# Patient Record
Sex: FEMALE | Race: WHITE | Employment: OTHER | URBAN - METROPOLITAN AREA
[De-identification: names, ages, dates, MRNs, and addresses within clinical notes are randomized per-mention and may not be internally consistent; named-entity substitution may affect disease eponyms.]

---

## 2017-01-01 ENCOUNTER — APPOINTMENT (OUTPATIENT)
Dept: CV DIAGNOSTICS | Facility: HOSPITAL | Age: 82
DRG: 683 | End: 2017-01-01
Attending: INTERNAL MEDICINE
Payer: MEDICARE

## 2017-01-01 ENCOUNTER — APPOINTMENT (OUTPATIENT)
Dept: CT IMAGING | Facility: HOSPITAL | Age: 82
End: 2017-01-01
Attending: EMERGENCY MEDICINE
Payer: MEDICARE

## 2017-01-01 ENCOUNTER — HOSPITAL ENCOUNTER (EMERGENCY)
Facility: HOSPITAL | Age: 82
Discharge: HOME | End: 2017-01-01
Attending: EMERGENCY MEDICINE
Payer: MEDICARE

## 2017-01-01 ENCOUNTER — HOSPITAL ENCOUNTER (INPATIENT)
Facility: HOSPITAL | Age: 82
LOS: 3 days | Discharge: NURSING, REHAB OR LONG-TERM CARE | DRG: 812 | End: 2017-01-01
Attending: EMERGENCY MEDICINE | Admitting: HOSPITALIST
Payer: MEDICARE

## 2017-01-01 ENCOUNTER — SNF VISIT (OUTPATIENT)
Dept: INTERNAL MEDICINE CLINIC | Age: 82
End: 2017-01-01

## 2017-01-01 ENCOUNTER — SNF VISIT (OUTPATIENT)
Dept: FAMILY MEDICINE CLINIC | Facility: CLINIC | Age: 82
End: 2017-01-01

## 2017-01-01 ENCOUNTER — APPOINTMENT (OUTPATIENT)
Dept: CT IMAGING | Facility: HOSPITAL | Age: 82
DRG: 683 | End: 2017-01-01
Attending: EMERGENCY MEDICINE
Payer: MEDICARE

## 2017-01-01 ENCOUNTER — APPOINTMENT (OUTPATIENT)
Dept: GENERAL RADIOLOGY | Facility: HOSPITAL | Age: 82
DRG: 683 | End: 2017-01-01
Attending: EMERGENCY MEDICINE
Payer: MEDICARE

## 2017-01-01 ENCOUNTER — HOSPITAL ENCOUNTER (INPATIENT)
Facility: HOSPITAL | Age: 82
LOS: 2 days | Discharge: NURSING, REHAB OR LONG-TERM CARE | DRG: 683 | End: 2017-01-01
Attending: EMERGENCY MEDICINE | Admitting: INTERNAL MEDICINE
Payer: MEDICARE

## 2017-01-01 VITALS
DIASTOLIC BLOOD PRESSURE: 39 MMHG | WEIGHT: 131 LBS | RESPIRATION RATE: 18 BRPM | BODY MASS INDEX: 29.47 KG/M2 | SYSTOLIC BLOOD PRESSURE: 99 MMHG | HEIGHT: 56 IN | OXYGEN SATURATION: 99 % | TEMPERATURE: 98 F | HEART RATE: 72 BPM

## 2017-01-01 VITALS
RESPIRATION RATE: 19 BRPM | DIASTOLIC BLOOD PRESSURE: 46 MMHG | HEART RATE: 68 BPM | BODY MASS INDEX: 29 KG/M2 | OXYGEN SATURATION: 95 % | SYSTOLIC BLOOD PRESSURE: 98 MMHG | TEMPERATURE: 98 F | WEIGHT: 131 LBS

## 2017-01-01 VITALS
OXYGEN SATURATION: 94 % | RESPIRATION RATE: 18 BRPM | HEART RATE: 78 BPM | SYSTOLIC BLOOD PRESSURE: 95 MMHG | TEMPERATURE: 100 F | DIASTOLIC BLOOD PRESSURE: 53 MMHG

## 2017-01-01 VITALS
WEIGHT: 131 LBS | HEART RATE: 76 BPM | SYSTOLIC BLOOD PRESSURE: 125 MMHG | DIASTOLIC BLOOD PRESSURE: 67 MMHG | TEMPERATURE: 98 F | BODY MASS INDEX: 29 KG/M2 | RESPIRATION RATE: 16 BRPM | OXYGEN SATURATION: 98 %

## 2017-01-01 VITALS
OXYGEN SATURATION: 97 % | DIASTOLIC BLOOD PRESSURE: 58 MMHG | HEART RATE: 72 BPM | RESPIRATION RATE: 18 BRPM | SYSTOLIC BLOOD PRESSURE: 108 MMHG | TEMPERATURE: 98 F

## 2017-01-01 DIAGNOSIS — R53.1 WEAKNESS: Primary | ICD-10-CM

## 2017-01-01 DIAGNOSIS — S09.90XA INJURY OF HEAD, INITIAL ENCOUNTER: ICD-10-CM

## 2017-01-01 DIAGNOSIS — N39.0 URINARY TRACT INFECTION WITHOUT HEMATURIA, SITE UNSPECIFIED: ICD-10-CM

## 2017-01-01 DIAGNOSIS — D64.9 ANEMIA, UNSPECIFIED TYPE: ICD-10-CM
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
## 2017-01-01 DIAGNOSIS — K92.1 MELENA: ICD-10-CM

## 2017-01-01 DIAGNOSIS — I95.9 HYPOTENSION, UNSPECIFIED HYPOTENSION TYPE: ICD-10-CM

## 2017-01-01 DIAGNOSIS — R77.8 TROPONIN LEVEL ELEVATED: ICD-10-CM

## 2017-01-01 DIAGNOSIS — M54.2 NECK PAIN: Primary | ICD-10-CM

## 2017-01-01 DIAGNOSIS — D50.8 OTHER IRON DEFICIENCY ANEMIA: Primary | ICD-10-CM

## 2017-01-01 LAB
ALBUMIN SERPL-MCNC: 2.6 G/DL (ref 3.5–4.8)
ALP LIVER SERPL-CCNC: 120 U/L (ref 55–142)
ALT SERPL-CCNC: 17 U/L (ref 14–54)
ANTIBODY SCREEN: NEGATIVE
APTT PPP: 36.5 SECONDS (ref 25–34)
AST SERPL-CCNC: 40 U/L (ref 15–41)
ATRIAL RATE: 56 BPM
BASOPHILS # BLD AUTO: 0.02 X10(3) UL (ref 0–0.1)
BASOPHILS # BLD AUTO: 0.02 X10(3) UL (ref 0–0.1)
BASOPHILS NFR BLD AUTO: 0.2 %
BASOPHILS NFR BLD AUTO: 0.3 %
BILIRUB SERPL-MCNC: 0.3 MG/DL (ref 0.1–2)
BILIRUB UR QL STRIP.AUTO: NEGATIVE
BLOOD TYPE BARCODE: 8400
BUN BLD-MCNC: 23 MG/DL (ref 8–20)
BUN BLD-MCNC: 27 MG/DL (ref 8–20)
CALCIUM BLD-MCNC: 7.9 MG/DL (ref 8.3–10.3)
CALCIUM BLD-MCNC: 8.6 MG/DL (ref 8.3–10.3)
CHLORIDE: 107 MMOL/L (ref 101–111)
CHLORIDE: 111 MMOL/L (ref 101–111)
CK: 818 IU/L (ref 26–192)
CKMB: 31.3 NG/ML (ref 0–5)
CO2: 24 MMOL/L (ref 22–32)
CO2: 25 MMOL/L (ref 22–32)
CREAT BLD-MCNC: 0.76 MG/DL (ref 0.55–1.02)
CREAT BLD-MCNC: 1.09 MG/DL (ref 0.55–1.02)
EOSINOPHIL # BLD AUTO: 0.04 X10(3) UL (ref 0–0.3)
EOSINOPHIL # BLD AUTO: 0.48 X10(3) UL (ref 0–0.3)
EOSINOPHIL NFR BLD AUTO: 0.4 %
EOSINOPHIL NFR BLD AUTO: 6.7 %
ERYTHROCYTE [DISTWIDTH] IN BLOOD BY AUTOMATED COUNT: 15.1 % (ref 11.5–16)
ERYTHROCYTE [DISTWIDTH] IN BLOOD BY AUTOMATED COUNT: 15.3 % (ref 11.5–16)
ERYTHROCYTE [DISTWIDTH] IN BLOOD BY AUTOMATED COUNT: 15.5 % (ref 11.5–16)
ERYTHROCYTE [DISTWIDTH] IN BLOOD BY AUTOMATED COUNT: 16.4 % (ref 11.5–16)
GLUCOSE BLD-MCNC: 143 MG/DL (ref 70–99)
GLUCOSE BLD-MCNC: 85 MG/DL (ref 70–99)
GLUCOSE UR STRIP.AUTO-MCNC: NEGATIVE MG/DL
HCT VFR BLD AUTO: 25.3 % (ref 34–50)
HCT VFR BLD AUTO: 25.3 % (ref 34–50)
HCT VFR BLD AUTO: 25.6 % (ref 34–50)
HCT VFR BLD AUTO: 27.3 % (ref 34–50)
HGB BLD-MCNC: 7.5 G/DL (ref 12–16)
HGB BLD-MCNC: 8 G/DL (ref 12–16)
HGB BLD-MCNC: 8 G/DL (ref 12–16)
HGB BLD-MCNC: 8.4 G/DL (ref 12–16)
HYALINE CASTS #/AREA URNS AUTO: PRESENT /LPF
IMMATURE GRANULOCYTE COUNT: 0.03 X10(3) UL (ref 0–1)
IMMATURE GRANULOCYTE COUNT: 0.06 X10(3) UL (ref 0–1)
IMMATURE GRANULOCYTE RATIO %: 0.4 %
IMMATURE GRANULOCYTE RATIO %: 0.5 %
INR BLD: 1.09 (ref 0.89–1.11)
LYMPHOCYTES # BLD AUTO: 0.56 X10(3) UL (ref 0.9–4)
LYMPHOCYTES # BLD AUTO: 0.99 X10(3) UL (ref 0.9–4)
LYMPHOCYTES NFR BLD AUTO: 13.8 %
LYMPHOCYTES NFR BLD AUTO: 4.9 %
M PROTEIN MFR SERPL ELPH: 6.5 G/DL (ref 6.1–8.3)
MB INDEX: 4 % (ref ?–4)
MCH RBC QN AUTO: 23.7 PG (ref 27–33.2)
MCH RBC QN AUTO: 24.7 PG (ref 27–33.2)
MCH RBC QN AUTO: 25.2 PG (ref 27–33.2)
MCH RBC QN AUTO: 25.6 PG (ref 27–33.2)
MCHC RBC AUTO-ENTMCNC: 29.6 G/DL (ref 31–37)
MCHC RBC AUTO-ENTMCNC: 30.8 G/DL (ref 31–37)
MCHC RBC AUTO-ENTMCNC: 31.3 G/DL (ref 31–37)
MCHC RBC AUTO-ENTMCNC: 31.6 G/DL (ref 31–37)
MCV RBC AUTO: 80.1 FL (ref 81–100)
MCV RBC AUTO: 80.3 FL (ref 81–100)
MCV RBC AUTO: 80.8 FL (ref 81–100)
MCV RBC AUTO: 80.8 FL (ref 81–100)
MONOCYTES # BLD AUTO: 0.67 X10(3) UL (ref 0.1–0.6)
MONOCYTES # BLD AUTO: 0.75 X10(3) UL (ref 0.1–0.6)
MONOCYTES NFR BLD AUTO: 6.6 %
MONOCYTES NFR BLD AUTO: 9.4 %
NEUTROPHIL ABS PRELIM: 4.96 X10 (3) UL (ref 1.3–6.7)
NEUTROPHIL ABS PRELIM: 9.91 X10 (3) UL (ref 1.3–6.7)
NEUTROPHILS # BLD AUTO: 4.96 X10(3) UL (ref 1.3–6.7)
NEUTROPHILS # BLD AUTO: 9.91 X10(3) UL (ref 1.3–6.7)
NEUTROPHILS NFR BLD AUTO: 69.4 %
NEUTROPHILS NFR BLD AUTO: 87.4 %
NITRITE UR QL STRIP.AUTO: NEGATIVE
P AXIS: 41 DEGREES
P-R INTERVAL: 138 MS
PH UR STRIP.AUTO: 5 [PH] (ref 4.5–8)
PLATELET # BLD AUTO: 287 10(3)UL (ref 150–450)
PLATELET # BLD AUTO: 290 10(3)UL (ref 150–450)
PLATELET # BLD AUTO: 299 10(3)UL (ref 150–450)
PLATELET # BLD AUTO: 367 10(3)UL (ref 150–450)
POTASSIUM SERPL-SCNC: 3.3 MMOL/L (ref 3.6–5.1)
POTASSIUM SERPL-SCNC: 4 MMOL/L (ref 3.6–5.1)
PROT UR STRIP.AUTO-MCNC: 30 MG/DL
PSA SERPL DL<=0.01 NG/ML-MCNC: 14.1 SECONDS (ref 12–14.3)
Q-T INTERVAL: 518 MS
QRS DURATION: 146 MS
QTC CALCULATION (BEZET): 499 MS
R AXIS: -62 DEGREES
RBC # BLD AUTO: 3.13 X10(6)UL (ref 3.8–5.1)
RBC # BLD AUTO: 3.16 X10(6)UL (ref 3.8–5.1)
RBC # BLD AUTO: 3.17 X10(6)UL (ref 3.8–5.1)
RBC # BLD AUTO: 3.4 X10(6)UL (ref 3.8–5.1)
RED CELL DISTRIBUTION WIDTH-SD: 43.8 FL (ref 35.1–46.3)
RED CELL DISTRIBUTION WIDTH-SD: 44.2 FL (ref 35.1–46.3)
RED CELL DISTRIBUTION WIDTH-SD: 45.6 FL (ref 35.1–46.3)
RED CELL DISTRIBUTION WIDTH-SD: 47.3 FL (ref 35.1–46.3)
RH BLOOD TYPE: POSITIVE
SODIUM SERPL-SCNC: 141 MMOL/L (ref 136–144)
SODIUM SERPL-SCNC: 143 MMOL/L (ref 136–144)
SP GR UR STRIP.AUTO: 1.02 (ref 1–1.03)
T AXIS: 54 DEGREES
TROPONIN: 0.07 NG/ML (ref ?–0.05)
TROPONIN: 0.07 NG/ML (ref ?–0.05)
TROPONIN: 0.1 NG/ML (ref ?–0.05)
UROBILINOGEN UR STRIP.AUTO-MCNC: 4 MG/DL
VENTRICULAR RATE: 56 BPM
WBC # BLD AUTO: 11.3 X10(3) UL (ref 4–13)
WBC # BLD AUTO: 6.5 X10(3) UL (ref 4–13)
WBC # BLD AUTO: 6.7 X10(3) UL (ref 4–13)
WBC # BLD AUTO: 7.2 X10(3) UL (ref 4–13)
WBC CLUMPS UR QL AUTO: PRESENT

## 2017-01-01 PROCEDURE — 36430 TRANSFUSION BLD/BLD COMPNT: CPT

## 2017-01-01 PROCEDURE — 87081 CULTURE SCREEN ONLY: CPT | Performed by: HOSPITALIST

## 2017-01-01 PROCEDURE — 97530 THERAPEUTIC ACTIVITIES: CPT

## 2017-01-01 PROCEDURE — 86850 RBC ANTIBODY SCREEN: CPT | Performed by: EMERGENCY MEDICINE

## 2017-01-01 PROCEDURE — 96365 THER/PROPH/DIAG IV INF INIT: CPT

## 2017-01-01 PROCEDURE — 97167 OT EVAL HIGH COMPLEX 60 MIN: CPT

## 2017-01-01 PROCEDURE — 96374 THER/PROPH/DIAG INJ IV PUSH: CPT

## 2017-01-01 PROCEDURE — 80048 BASIC METABOLIC PNL TOTAL CA: CPT | Performed by: HOSPITALIST

## 2017-01-01 PROCEDURE — 82553 CREATINE MB FRACTION: CPT | Performed by: INTERNAL MEDICINE

## 2017-01-01 PROCEDURE — 30233N1 TRANSFUSION OF NONAUTOLOGOUS RED BLOOD CELLS INTO PERIPHERAL VEIN, PERCUTANEOUS APPROACH: ICD-10-PCS | Performed by: INTERNAL MEDICINE

## 2017-01-01 PROCEDURE — 82272 OCCULT BLD FECES 1-3 TESTS: CPT

## 2017-01-01 PROCEDURE — 86901 BLOOD TYPING SEROLOGIC RH(D): CPT | Performed by: EMERGENCY MEDICINE

## 2017-01-01 PROCEDURE — 87184 SC STD DISK METHOD PER PLATE: CPT | Performed by: EMERGENCY MEDICINE

## 2017-01-01 PROCEDURE — 99305 1ST NF CARE MODERATE MDM 35: CPT | Performed by: NURSE PRACTITIONER

## 2017-01-01 PROCEDURE — 96375 TX/PRO/DX INJ NEW DRUG ADDON: CPT

## 2017-01-01 PROCEDURE — 86900 BLOOD TYPING SEROLOGIC ABO: CPT | Performed by: EMERGENCY MEDICINE

## 2017-01-01 PROCEDURE — 85025 COMPLETE CBC W/AUTO DIFF WBC: CPT | Performed by: EMERGENCY MEDICINE

## 2017-01-01 PROCEDURE — 97110 THERAPEUTIC EXERCISES: CPT

## 2017-01-01 PROCEDURE — 84300 ASSAY OF URINE SODIUM: CPT | Performed by: HOSPITALIST

## 2017-01-01 PROCEDURE — 87077 CULTURE AEROBIC IDENTIFY: CPT | Performed by: EMERGENCY MEDICINE

## 2017-01-01 PROCEDURE — 97535 SELF CARE MNGMENT TRAINING: CPT

## 2017-01-01 PROCEDURE — 99285 EMERGENCY DEPT VISIT HI MDM: CPT

## 2017-01-01 PROCEDURE — 80053 COMPREHEN METABOLIC PANEL: CPT | Performed by: EMERGENCY MEDICINE

## 2017-01-01 PROCEDURE — 36415 COLL VENOUS BLD VENIPUNCTURE: CPT

## 2017-01-01 PROCEDURE — C9113 INJ PANTOPRAZOLE SODIUM, VIA: HCPCS | Performed by: EMERGENCY MEDICINE

## 2017-01-01 PROCEDURE — 86920 COMPATIBILITY TEST SPIN: CPT

## 2017-01-01 PROCEDURE — 85025 COMPLETE CBC W/AUTO DIFF WBC: CPT | Performed by: HOSPITALIST

## 2017-01-01 PROCEDURE — 96361 HYDRATE IV INFUSION ADD-ON: CPT

## 2017-01-01 PROCEDURE — 93010 ELECTROCARDIOGRAM REPORT: CPT

## 2017-01-01 PROCEDURE — 81001 URINALYSIS AUTO W/SCOPE: CPT | Performed by: EMERGENCY MEDICINE

## 2017-01-01 PROCEDURE — 84439 ASSAY OF FREE THYROXINE: CPT | Performed by: HOSPITALIST

## 2017-01-01 PROCEDURE — 84484 ASSAY OF TROPONIN QUANT: CPT | Performed by: EMERGENCY MEDICINE

## 2017-01-01 PROCEDURE — 97162 PT EVAL MOD COMPLEX 30 MIN: CPT

## 2017-01-01 PROCEDURE — 73502 X-RAY EXAM HIP UNI 2-3 VIEWS: CPT | Performed by: EMERGENCY MEDICINE

## 2017-01-01 PROCEDURE — 73700 CT LOWER EXTREMITY W/O DYE: CPT | Performed by: EMERGENCY MEDICINE

## 2017-01-01 PROCEDURE — 84443 ASSAY THYROID STIM HORMONE: CPT | Performed by: HOSPITALIST

## 2017-01-01 PROCEDURE — 87086 URINE CULTURE/COLONY COUNT: CPT | Performed by: EMERGENCY MEDICINE

## 2017-01-01 PROCEDURE — 99308 SBSQ NF CARE LOW MDM 20: CPT | Performed by: FAMILY MEDICINE

## 2017-01-01 PROCEDURE — 85730 THROMBOPLASTIN TIME PARTIAL: CPT | Performed by: EMERGENCY MEDICINE

## 2017-01-01 PROCEDURE — 87186 SC STD MICRODIL/AGAR DIL: CPT | Performed by: FAMILY MEDICINE

## 2017-01-01 PROCEDURE — 82550 ASSAY OF CK (CPK): CPT | Performed by: INTERNAL MEDICINE

## 2017-01-01 PROCEDURE — 87493 C DIFF AMPLIFIED PROBE: CPT | Performed by: INTERNAL MEDICINE

## 2017-01-01 PROCEDURE — 99284 EMERGENCY DEPT VISIT MOD MDM: CPT

## 2017-01-01 PROCEDURE — 87186 SC STD MICRODIL/AGAR DIL: CPT | Performed by: EMERGENCY MEDICINE

## 2017-01-01 PROCEDURE — 72125 CT NECK SPINE W/O DYE: CPT | Performed by: EMERGENCY MEDICINE

## 2017-01-01 PROCEDURE — 87077 CULTURE AEROBIC IDENTIFY: CPT | Performed by: FAMILY MEDICINE

## 2017-01-01 PROCEDURE — 83935 ASSAY OF URINE OSMOLALITY: CPT | Performed by: HOSPITALIST

## 2017-01-01 PROCEDURE — 84484 ASSAY OF TROPONIN QUANT: CPT | Performed by: HOSPITALIST

## 2017-01-01 PROCEDURE — 93005 ELECTROCARDIOGRAM TRACING: CPT

## 2017-01-01 PROCEDURE — 71010 XR CHEST AP PORTABLE  (CPT=71010): CPT | Performed by: EMERGENCY MEDICINE

## 2017-01-01 PROCEDURE — 85025 COMPLETE CBC W/AUTO DIFF WBC: CPT | Performed by: INTERNAL MEDICINE

## 2017-01-01 PROCEDURE — 93306 TTE W/DOPPLER COMPLETE: CPT | Performed by: INTERNAL MEDICINE

## 2017-01-01 PROCEDURE — 76377 3D RENDER W/INTRP POSTPROCES: CPT | Performed by: EMERGENCY MEDICINE

## 2017-01-01 PROCEDURE — 83930 ASSAY OF BLOOD OSMOLALITY: CPT | Performed by: HOSPITALIST

## 2017-01-01 PROCEDURE — 85027 COMPLETE CBC AUTOMATED: CPT | Performed by: HOSPITALIST

## 2017-01-01 PROCEDURE — 85610 PROTHROMBIN TIME: CPT | Performed by: EMERGENCY MEDICINE

## 2017-01-01 PROCEDURE — 82746 ASSAY OF FOLIC ACID SERUM: CPT | Performed by: FAMILY MEDICINE

## 2017-01-01 PROCEDURE — 87086 URINE CULTURE/COLONY COUNT: CPT | Performed by: FAMILY MEDICINE

## 2017-01-01 PROCEDURE — 73560 X-RAY EXAM OF KNEE 1 OR 2: CPT | Performed by: EMERGENCY MEDICINE

## 2017-01-01 PROCEDURE — 82607 VITAMIN B-12: CPT | Performed by: FAMILY MEDICINE

## 2017-01-01 PROCEDURE — 70450 CT HEAD/BRAIN W/O DYE: CPT | Performed by: EMERGENCY MEDICINE

## 2017-01-01 RX ORDER — ONDANSETRON 2 MG/ML
4 INJECTION INTRAMUSCULAR; INTRAVENOUS EVERY 6 HOURS PRN
Status: DISCONTINUED | OUTPATIENT
Start: 2017-01-01 | End: 2017-01-01

## 2017-01-01 RX ORDER — LEVOTHYROXINE SODIUM 0.05 MG/1
50 TABLET ORAL
Status: DISCONTINUED | OUTPATIENT
Start: 2017-01-01 | End: 2017-01-01

## 2017-01-01 RX ORDER — POTASSIUM CHLORIDE 20 MEQ/1
40 TABLET, EXTENDED RELEASE ORAL ONCE
Status: COMPLETED | OUTPATIENT
Start: 2017-01-01 | End: 2017-01-01

## 2017-01-01 RX ORDER — HYDROCODONE BITARTRATE AND ACETAMINOPHEN 5; 325 MG/1; MG/1
1-2 TABLET ORAL EVERY 4 HOURS PRN
Qty: 20 TABLET | Refills: 0 | Status: SHIPPED | OUTPATIENT
Start: 2017-01-01 | End: 2017-01-01

## 2017-01-01 RX ORDER — CEPHALEXIN 500 MG/1
500 CAPSULE ORAL EVERY 8 HOURS SCHEDULED
Qty: 9 CAPSULE | Refills: 0 | Status: SHIPPED | OUTPATIENT
Start: 2017-01-01 | End: 2017-01-01

## 2017-01-01 RX ORDER — PROCHLORPERAZINE MALEATE 5 MG/1
5 TABLET ORAL EVERY 6 HOURS PRN
Status: DISCONTINUED | OUTPATIENT
Start: 2017-01-01 | End: 2017-01-01

## 2017-01-01 RX ORDER — SODIUM CHLORIDE 9 MG/ML
INJECTION, SOLUTION INTRAVENOUS CONTINUOUS
Status: DISCONTINUED | OUTPATIENT
Start: 2017-01-01 | End: 2017-01-01

## 2017-01-01 RX ORDER — LIDOCAINE HYDROCHLORIDE 10 MG/ML
20 INJECTION, SOLUTION INFILTRATION; PERINEURAL ONCE
Status: DISCONTINUED | OUTPATIENT
Start: 2017-01-01 | End: 2017-01-01

## 2017-01-01 RX ORDER — SODIUM CHLORIDE 9 MG/ML
INJECTION, SOLUTION INTRAVENOUS CONTINUOUS
Status: CANCELLED | OUTPATIENT
Start: 2017-01-01 | End: 2017-01-01

## 2017-01-01 RX ORDER — MELATONIN
325 3 TIMES DAILY
Status: DISCONTINUED | OUTPATIENT
Start: 2017-01-01 | End: 2017-01-01

## 2017-01-01 RX ORDER — MELATONIN
325
Status: DISCONTINUED | OUTPATIENT
Start: 2017-01-01 | End: 2017-01-01

## 2017-01-01 RX ORDER — BISACODYL 10 MG
10 SUPPOSITORY, RECTAL RECTAL
Status: DISCONTINUED | OUTPATIENT
Start: 2017-01-01 | End: 2017-01-01

## 2017-01-01 RX ORDER — ACETAMINOPHEN 325 MG/1
650 TABLET ORAL EVERY 6 HOURS PRN
COMMUNITY

## 2017-01-01 RX ORDER — SODIUM CHLORIDE 9 MG/ML
INJECTION, SOLUTION INTRAVENOUS CONTINUOUS
Status: ACTIVE | OUTPATIENT
Start: 2017-01-01 | End: 2017-01-01

## 2017-01-01 RX ORDER — ACETAMINOPHEN 325 MG/1
650 TABLET ORAL EVERY 6 HOURS PRN
Status: DISCONTINUED | OUTPATIENT
Start: 2017-01-01 | End: 2017-01-01

## 2017-01-01 RX ORDER — HYDROMORPHONE HYDROCHLORIDE 1 MG/ML
0.2 INJECTION, SOLUTION INTRAMUSCULAR; INTRAVENOUS; SUBCUTANEOUS EVERY 30 MIN PRN
Status: ACTIVE | OUTPATIENT
Start: 2017-01-01 | End: 2017-01-01

## 2017-01-01 RX ORDER — SODIUM CHLORIDE 9 MG/ML
125 INJECTION, SOLUTION INTRAVENOUS CONTINUOUS
Status: DISCONTINUED | OUTPATIENT
Start: 2017-01-01 | End: 2017-01-01

## 2017-01-01 RX ORDER — ONDANSETRON 2 MG/ML
4 INJECTION INTRAMUSCULAR; INTRAVENOUS EVERY 4 HOURS PRN
Status: DISCONTINUED | OUTPATIENT
Start: 2017-01-01 | End: 2017-01-01

## 2017-01-01 RX ORDER — FUROSEMIDE 40 MG/1
40 TABLET ORAL DAILY
Status: DISCONTINUED | OUTPATIENT
Start: 2017-01-01 | End: 2017-01-01

## 2017-01-01 RX ORDER — SODIUM PHOSPHATE, DIBASIC AND SODIUM PHOSPHATE, MONOBASIC 7; 19 G/133ML; G/133ML
1 ENEMA RECTAL ONCE AS NEEDED
Status: DISCONTINUED | OUTPATIENT
Start: 2017-01-01 | End: 2017-01-01

## 2017-01-01 RX ORDER — POLYETHYLENE GLYCOL 3350 17 G/17G
17 POWDER, FOR SOLUTION ORAL DAILY PRN
Status: DISCONTINUED | OUTPATIENT
Start: 2017-01-01 | End: 2017-01-01

## 2017-01-01 RX ORDER — 0.9 % SODIUM CHLORIDE 0.9 %
VIAL (ML) INJECTION
Status: COMPLETED
Start: 2017-01-01 | End: 2017-01-01

## 2017-01-01 RX ORDER — METOCLOPRAMIDE 10 MG/1
10 TABLET ORAL EVERY 6 HOURS PRN
Status: DISCONTINUED | OUTPATIENT
Start: 2017-01-01 | End: 2017-01-01

## 2017-01-01 RX ORDER — DOCUSATE SODIUM 100 MG/1
100 CAPSULE, LIQUID FILLED ORAL 2 TIMES DAILY
Status: DISCONTINUED | OUTPATIENT
Start: 2017-01-01 | End: 2017-01-01

## 2017-01-01 RX ORDER — CEPHALEXIN 500 MG/1
500 CAPSULE ORAL EVERY 8 HOURS SCHEDULED
Status: DISCONTINUED | OUTPATIENT
Start: 2017-01-01 | End: 2017-01-01

## 2017-01-01 RX ORDER — HEPARIN SODIUM 5000 [USP'U]/ML
5000 INJECTION, SOLUTION INTRAVENOUS; SUBCUTANEOUS EVERY 8 HOURS SCHEDULED
Status: DISCONTINUED | OUTPATIENT
Start: 2017-01-01 | End: 2017-01-01

## 2017-05-05 NOTE — ED NOTES
Patient brought to ED by her neighbor. Per neighbor, no family knows that the patient is here. Neighbor is leaving to see if she can get a hold of patient's family. Patient's neighbor is Casandra Ch and her number is [phone].

## 2017-05-05 NOTE — ED PROVIDER NOTES
Patient Seen in: [facility] Emergency Department    History   Patient presents with:  Fall (musculoskeletal, neurologic)    Stated Complaint: fall    HPI    29-year-old female comes the hospital to complaint of having difficulty with discomfort on h above.    PSFH elements reviewed from today and agreed except as otherwise stated in HPI.     Physical Exam       ED Triage Vitals   BP 05/05/17 1540 131/64 mmHg   Pulse 05/05/17 1540 74   Resp 05/05/17 1540 16   Temp 05/05/17 1540 98.3 °F (36.8 °C)   Temp Index Registry.     PATIENT STATED HISTORY: (As transcribed by Technologist) Christy Lloyd had a fall 3 weeks ago. Patient complains of neck pain and unable to lift left arm this morning.         FINDINGS:  No fracture.  Disc space narrowing at C4-5, C5-6 and C6

## 2017-05-06 NOTE — ED NOTES
Call to patient's grandson, Donnella Mortimer, who states his brother is en route to  his grandmother. Arrived just as ended phone call.

## 2017-06-03 ENCOUNTER — HOSPITAL (OUTPATIENT)
Dept: OTHER | Age: 82
End: 2017-06-03
Attending: EMERGENCY MEDICINE
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
## 2017-09-13 PROBLEM — S09.90XA INJURY OF HEAD, INITIAL ENCOUNTER: Status: ACTIVE | Noted: 2017-01-01
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
## 2017-09-13 PROBLEM — R77.8 TROPONIN LEVEL ELEVATED: Status: ACTIVE | Noted: 2017-01-01

## 2017-09-13 PROBLEM — N39.0 URINARY TRACT INFECTION WITHOUT HEMATURIA, SITE UNSPECIFIED: Status: ACTIVE | Noted: 2017-01-01

## 2017-09-13 NOTE — CONSULTS
[facility]  Report of Consultation    Amanda Solis Patient Status:  Emergency    1918 MRN [id number]   Location [address] [facility] Attending Kimberly Henry MD   Hosp Day # 0 PCP Gem Chan DO     Reason for Consultat not sure if she passed out. Medics and family came and she was brought to ER. W/u revealed several left rib fx although not sure if they are old or new. She has a swollen right knee without fracture although there is posterior displacement. Troponin 0. shaft/metaphysis with posterior angulation of the distal femoral metaphysis which is likely chronic. An acute fracture is not discretely noted. 3. No hardware failure or complications are appreciated.   4. Suprapatellar joint effusion with suggestion of ca

## 2017-09-13 NOTE — H&P
[facility] Hospitalist History and Physical      Patient presents with:  Fall (musculoskeletal, neurologic)       PCP: Francisco Vila DO      History of Present Illness: Patient is a 80year old female with PMH sig for B12 deficiency anemia who presents for evalu pallor, EOMs intact. Nose: Nares normal. Septum midline. Mucosa normal. No drainage.    Throat: Lips, mucosa, and tongue normal. Teeth and gums normal.   Neck: Supple, symmetrical, trachea midline, no cervical or supraclavicular lymph adenopathy, thyroid angulation of the distal femoral metaphysis which is likely chronic. An acute fracture is not discretely identified. SOFT TISSUES:  Negative. No visible soft tissue swelling.  EFFUSION:  There is evidence of a suprapatellar joint effusion with multiple sma CONCLUSION:  1. No acute intracranial process appreciated. 2. Mild to moderate diffuse atrophy and white matter disease, suggesting chronic small vessel ischemic changes. Dictated by: Radha Nazario DO on 9/13/2017 at 16:24     Approved by:  Dolores Silva skull base through C7. Multiplanar reconstructions are generated. Dose reduction techniques were used.  Dose information is transmitted to the [facility] [employer] of Radiology) NRDR ([address]) which includes the Dose Index Regist CONCLUSION:  1. Multiple left sided posterior lateral rib fractures without evidence of pneumothorax. The fractures are of uncertain acuity. 2. Hyperaeration of the lungs consistent with emphysema/COPD.  3. Mild pleural-parenchymal changes in the left c has Guaiac positive stool but no melena and no signs of active bleeding  --will transfuse 1 unit PRBCs as this may have contributed to fall and generalized weakness  --monitor CBC in AM post transfusion  --at this time likely does not need further GI evalu

## 2017-09-13 NOTE — ED INITIAL ASSESSMENT (HPI)
Patient presents following fall this morning, she was assisted by EMS but later developed pain to right knee. Patient currently alert to time and place.

## 2017-09-13 NOTE — ED PROVIDER NOTES
Patient Seen in: [facility] Emergency Department    History   Patient presents with:  Fall (musculoskeletal, neurologic)    Stated Complaint: fall, leg pain    HPI    This is a 80-year-old female who arrives here with complaints of a fall.   The joselito Exam  General: . Older frail female in no respiratory distress. There is no signs of trauma in the head or neck there is no midline neck tenderness she does complain of some mild right paraspinal neck tenderness.   But no midline tenderness  The patient i (*)     All other components within normal limits   TROPONIN I - Abnormal; Notable for the following:     Troponin 0.095 (*)     All other components within normal limits   PTT, ACTIVATED - Abnormal; Notable for the following:     PTT 36.5 (*)     All othe DRAW GOLD   URINE CULTURE, ROUTINE          ============================================================  ED Course  ------------------------------------------------------------  MDM       The patient was placed on monitors, IV was started, blood was drawn at 15:28     Approved by: Jason Ruiz DO            Ct Brain Or Head ([id number])    Result Date: 9/13/2017  PROCEDURE:  CT BRAIN OR HEAD (96332)  COMPARISON:  HARESH ALCALA IACS (W+WO) (CPT=70553), 6/02/2014, 11:07.   INDICATIONS:  fall, leg pain  TECHNIQUE: transmitted to the [facility] Radiology) [address] ([address]) which includes the Dose Index Registry  PATIENT STATED HISTORY: (As transcribed by Technologist)  Patient fell this morning. Patient complains of right leg pain. multilevel extensive disc height loss and endplate osteophytes most pronounced at C6-7. PARASPINAL AREA:  No paraspinal soft tissue swelling or mass lesions are identified. CONCLUSION:  1. No acute osseous injury/fractures noted.  2. Extensive degenera abnormalities of the pelvic region or hips. There is extensive degenerative disc disease at L4-5 with vacuum disc change. There is axial migration of both femoral heads into the acetabula consistent with underlying osteoarthropathy.  SOFT TISSUES:  Negative patient felt comfortable. She is moving all extremities.             Disposition and Plan     Clinical Impression:  Fall, initial encounter  (primary encounter diagnosis)  Injury of head, initial encounter  Urinary tract infection without hematuria, site u

## 2017-09-14 NOTE — PLAN OF CARE
Patient/Family Goals    • Patient/Family Long Term Goal Progressing    • Patient/Family Short Term Goal Progressing            NURSING ADMISSION NOTE      Patient admitted via Cart  Oriented to room. Safety precautions initiated. Bed in low position.   Ca

## 2017-09-14 NOTE — CM/SW NOTE
09/14/17 1700   CM/SW Referral Data   Referral Source Physician   Reason for Referral Discharge planning   Informant Patient   Pertinent Medical Hx   Primary Care Physician Name Dr. Aury Silvestre   Patient Info   Patient's Mental Status Alert;Oriented;Confused

## 2017-09-14 NOTE — PROGRESS NOTES
[facility] Hospitalist Progress Note                                                                   [address]  1/8/1918    SUBJECTIVE: f/u fall    Ms. Aggarwal Creed complaining of heel pain tod Odilia Iverson is a 79 yo female who presented for evaluation after a fall at home, found to have UTI and acute on chronic anemia     UTI  --could be etiology of fall  --culture growing Gram neg rods, likely E. Coli; continue IV ceftriaxone and deescelate when fi

## 2017-09-14 NOTE — PROGRESS NOTES
[facility]  Cardiology Progress Note    Shelby Sosa Patient Status:  Inpatient    1918 MRN [id number]   [facility] 8NE-A Attending Inga Lehman MD   [facility] Day # 1 PCP Bharti Rubio DO     Assessment:  Possible syncope Gross per 24 hour   Intake             1930 ml   Output               45 ml   Net             1885 ml     Last 3 Weights  09/13/17 1357 : 131 lb (59.4 kg)  06/22/17 1049 : 133 lb (60.3 kg)  05/05/17 1540 : 131 lb (59.4 kg)  02/17/17 1042 : 136 lb (61.7 kg)

## 2017-09-14 NOTE — PHYSICAL THERAPY NOTE
PHYSICAL THERAPY EVALUATION - INPATIENT     Room Number: 5993/1364-T  Evaluation Date: 9/14/2017  Type of Evaluation: Initial  Physician Order: PT Eval and Treat    Presenting Problem: fall, UTI  Reason for Therapy: Mobility Dysfunction and Discharge P Impaired  · Orientation Level:  oriented to person  · Memory:  impaired working memory, decreased recall of biographical information and decreased short term memory  · Following Commands:  follows one step commands with repetition  · Safety Judgement:  dec (G-Code): CL    FUNCTIONAL ABILITY STATUS  Gait Assessment   Gait Assistance: Not tested              Comment : pt dep for chair transfer, rec total lift for nsg    Skilled Therapy Provided: pt recd in supine, grandson present and assisting with informatio Form for the patient is 77% degree of basic mobility impairment. Based on this evaluation, patient's clinical presentation is evolving and overall the evaluation complexity is considered moderate.   These impairments and comorbidities manifest themselves

## 2017-09-14 NOTE — PLAN OF CARE
Patient/Family Goals    • Patient/Family Long Term Goal Progressing    • Patient/Family Short Term Goal Progressing        Patient is alert and oriented x4. NSR/SB on tele. Oxygenation is 94% on RA. VS stable. L sounds clear.  Patient is incontinent and br

## 2017-09-15 NOTE — OCCUPATIONAL THERAPY NOTE
OCCUPATIONAL THERAPY EVALUATION - INPATIENT     Room Number: 1161/2259-R  Evaluation Date: 9/15/2017  Type of Evaluation: Initial  Presenting Problem: Elevated troponin, Head injury, fall, UTI    Physician Order: IP Consult to Occupational Therapy  Reason oriented to person  Memory:  decreased recall of precautions, decreased recall of recent events, decreased long term memory and decreased short term memory  Following Commands:  follows one-step commands inconsistently  Initiation: cues to initiate tasks a Inpatient Daily Activity Short Form  How much help from another person does the patient currently need…  -   Putting on and taking off regular lower body clothing?: Total  -   Bathing (including washing, rinsing, drying)?: Total  -   Toileting, which inclu deficits: decreased ADL/IADL independence, decreased activity tolerance/endurance, decreased mobility/transfers, increased overall functional decline.  These deficits impact the patient’s ability to participate in all mobility and ADL's, instrumental [place]

## 2017-09-15 NOTE — PROGRESS NOTES
[facility]  Cardiology Progress Note    Nova Harrell Patient Status:  Inpatient    1918 MRN [id number]   [facility] 8NE-A Attending Armand Fernández, DO   Hosp Day # 1 PCP Fernanda Perkins DO     Assessment:  Syncope?-normal LV f cefTRIAXone  1 g Intravenous Q24H              Recent Labs   09/13/17  1426 09/14/17  0426 09/15/17  0449   WBC 11.3 6.5 6.7   HGB 7.5* 8.0* 8.0*   .0 290.0 299.0       Recent Labs   09/13/17  1426 09/14/17  0426   BUN 27* 23*   CREATSERUM 1.09* 0.7

## 2017-09-15 NOTE — PROGRESS NOTES
I%mpaired Functional Mobility     • Achieve highest/safest level of mobility/gait Progressing           Patient/Family Goals     • Patient/Family Long Term Goal Progressing     • Patient/Family Short Term Goal Progressing          Lying in bed confused,Tel

## 2017-09-15 NOTE — PHYSICAL THERAPY NOTE
PHYSICAL THERAPY TREATMENT NOTE - INPATIENT    Room Number: 3906/1522-A     Session: 1   Number of Visits to Meet Established Goals: 5     History related to current admission: pt admitted from home due to fall, possible syncope, lives alone and found on bedside commode, etc.): A Lot   -   Moving from lying on back to sitting on the side of the bed?: A Lot   How much help from another person does the patient currently need. ..   -   Moving to and from a bed to a chair (including a wheelchair)?: A Lot   - motion;Strengthening;Patient education; Family education; Energy conservation;Transfer training  Rehab Potential : Guarded  Frequency (Obs): 5x/week    CURRENT GOALS     Goal #1 Patient is able to demonstrate supine - sit EOB @ level: moderate assistance   G

## 2017-09-15 NOTE — OCCUPATIONAL THERAPY NOTE
OCCUPATIONAL THERAPY TREATMENT NOTE - INPATIENT     Room Number: 6396/3297-S  Session: 1  Number of Visits to Meet Established Goals: 6    Presenting Problem: Elevated troponin, Head injury, fall, UTI    History related to current admission: pt admitted fr [facility]    FUNCTIONAL TRANSFER ASSESSMENT  Supine to Sit : Not tested  Sit to Stand: Dependent assistance (x2)    Skilled Therapy Provided: Pt presents up in B S Chair upon entering room. Extended time spent on cognition/oreintation this treatment.   Pat only or 3x/week    ADL Goals   Patient will perform grooming: with max assist  Patient will perform toileting: with max assist     Functional Transfer Goals  Patient will perform all functional transfers:  with max assist     UE Exercise Program Goal  Patient will

## 2017-09-15 NOTE — PLAN OF CARE
Impaired Functional Mobility    • Achieve highest/safest level of mobility/gait Progressing        Patient/Family Goals    • Patient/Family Long Term Goal Progressing    • Patient/Family Short Term Goal Progressing

## 2017-09-15 NOTE — CM/SW NOTE
JUAN CARLOS met with Pt and Pt's granddaughter, Twyla Gaucher who was at bedside to discuss d/c plans. Pt prefers to remain in her own home.  Twyla Gaucher reports that Pt lives in a 2 bedroom 2 bath condo so having a live-in caregiver would be ideal.  Family has contacted \"in-home

## 2017-09-15 NOTE — PROGRESS NOTES
[facility] Hospitalist Progress Note                                                                   [address]  1/8/1918    SUBJECTIVE: f/u fall    Ms. Ludwig Simeon is confused this AM. She james PEG 3350, bisacodyl, FLEET ENEMA, ondansetron HCl    Assessment/Plan:  Ms. Karina Acuña is a 81 yo female who presented for evaluation after a fall at home, found to have UTI and acute on chronic anemia     UTI  --could be etiology of fall  --culture growing Gr

## 2017-09-16 NOTE — PLAN OF CARE
Impaired Functional Mobility    • Achieve highest/safest level of mobility/gait Progressing        Patient/Family Goals    • Patient/Family Long Term Goal Progressing    • Patient/Family Short Term Goal Progressing        Cardiac monitor shows. ... SR  Cont.

## 2017-09-16 NOTE — CM/SW NOTE
angella met with ALISA and Mary regarding dc planning. angella explained therapy recommendations and medicare criteria and coverage.  Lawernce Serum requesting therapy to re-visit pt as he states pt was ambulatory with RW prior to admission and doesn't understand how TREVER is not

## 2017-09-16 NOTE — PROGRESS NOTES
NURSING DISCHARGE NOTE    Discharged Home via Wheelchair. Accompanied by Family member and Support staff  Belongings Taken by patient/family. VSS. Ok for DC per Dr. Erwin Penny. BP stable post 250cc 0.9 NaCl bolus.  DC papers and [employer] paper

## 2017-09-16 NOTE — PHYSICAL THERAPY NOTE
PHYSICAL THERAPY TREATMENT NOTE - INPATIENT    Room Number: 6663/4159-G     Session: 2  Number of Visits to Meet Established Goals: 5     History related to current admission: pt admitted from home due to fall, possible syncope, lives alone and found on h bed?: A Lot   How much help from another person does the patient currently need. ..   -   Moving to and from a bed to a chair (including a wheelchair)?: Total   -   Need to walk in hospital room?: Total   -   Climbing 3-5 steps with a railing?: Total recommend TREVER upon discharge as pt is alert and participatory in all therapy activities. Pt has had a significant, acute decline in functional mobility as she was previously ambulating with Mod I.   Additionally, pt is making small but notable gains with s

## 2017-09-16 NOTE — PROGRESS NOTES
[facility] Hospitalist Progress Note                                                                   [address]  1/8/1918    SUBJECTIVE: f/u fall    S: Seems less confused today.  C/O R knee evaluation after a fall at home, found to have UTI and acute on chronic anemia     UTI  --could be etiology of fall  --culture growing E.coli, reviewed S - started on keflex to complete course  --no evidence of sepsis at this time    Hypotension  -Resume I

## 2017-09-16 NOTE — PLAN OF CARE
Pt a/ox4. RA. Lungs clear but diminished to posterior bases bilaterally. Hearing aids bilaterally. Forgetful at times. Hallucinations overnight. EF = 55-60%. Heparin SC. UTI. IV Rocephin. Pt reports pain to right knee only.  Refuses medications at this time

## 2017-09-16 NOTE — CONSULTS
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 Dieudonne Smith in 1-2 week for long term options

## 2017-09-16 NOTE — CM/SW NOTE
[facility] of choice for family as well. angella spoke to RN who states that pt may also be ready for discharge today. Milka Spencer understands this will now be private pay for patient.  He has discussed private pay rates with elizabeth

## 2017-09-18 NOTE — PROGRESS NOTES
Dimitri Milligan  : 1918  Age 80year old  female patient is admitted to The [address] [place] [address] for respit rehabilitation due to [place] date:  2017  Discharge date to [facility]:  2017    Patient presents with: REVIEW OF SYSTEMS:  GENERAL HEALTH:feels well otherwise  SKIN: denies any unusual skin lesions or rashes dry flakey skin   WOUNDS: none   EYES:--- with correction  HENT: denies nasal congestion, sinus pain or sore throat; and --- positive for hearing bradycardia there is no acute ST elevations or ischemic findings. Xr Knee (1 Or 2 Views), Right (cpt=73560)     Result Date: 9/13/2017  PROCEDURE:  XR KNEE (1 OR 2 VIEWS), RIGHT (CPT=73560)  COMPARISON:  None.   INDICATIONS:  fall, leg pain  PATIENT STA STATED HISTORY: (As transcribed by Technologist)  Patient fell this morning. No additional information at this time. FINDINGS:   VENTRICLES/SULCI:  Ventricles and sulci are prominent.  INTRACRANIAL:  No acute intracranial hemorrhage, mass effect or midli the articular disc. SOFT TISSUES:  No soft tissue swelling or mass lesions appreciated. EFFUSION:  None visible. OTHER:  Negative.       CONCLUSION:  1. No acute traumatic injuries to the right hip region noted.  2. There is suggestion of arthritic changes (CPT=71010)  TECHNIQUE:  AP chest radiograph was obtained. COMPARISON:  None. INDICATIONS:  Status post fall. PATIENT STATED HISTORY: (As transcribed by Technologist)  Patient complains of right leg pain after a fall.     FINDINGS:  There is hyperaeratio Extensive disc disease of the lower lumbar spine. Dictated by: Colleen Dubois DO on 9/13/2017 at 15:24     Approved by: Colleen Dubois DO          The patient did get a workup including a type and screen.   The patient's CBC shows a hemoglobin of 7.5 the f

## 2017-09-18 NOTE — CM/SW NOTE
09/18/17 1600   Discharge disposition   Discharged to: Skilled Nurs  (9/16)   Discharge transportation Private car

## 2017-09-19 NOTE — DISCHARGE SUMMARY
General Medicine Discharge Summary     Patient ID:  Nova Harrell  80year old  1/8/1918    Admit date: 9/13/2017    Discharge date and time: 9/16/2017  6:13 PM     Attending Physician: Baron Odom Pain/swelling  -xr without fracture  -consult placed to ortho, no further workup needed  -unable to bear weight per family  -PT consulted     Rib fractures  --unclear if acute or happened during this fall  --patient not complaining of pain in her ribs  --l daily., Normal, Disp-90 tablet, R-1    Ferrous Sulfate 325 (65 Fe) MG Oral Tab  TK 1 T PO TID, Historical, R-2    furosemide 40 MG Oral Tab  TK 1 T PO QD, Historical, R-0      STOP taking these medications    HYDROcodone-acetaminophen 5-325 MG Oral Tab

## 2017-09-25 PROBLEM — R73.9 HYPERGLYCEMIA: Status: ACTIVE | Noted: 2017-01-01

## 2017-09-25 PROBLEM — R79.89 AZOTEMIA: Status: ACTIVE | Noted: 2017-01-01

## 2017-09-25 PROBLEM — E87.1 HYPONATREMIA: Status: ACTIVE | Noted: 2017-01-01

## 2017-09-25 PROBLEM — D50.8 OTHER IRON DEFICIENCY ANEMIA: Status: ACTIVE | Noted: 2017-01-01

## 2017-09-25 PROBLEM — D64.9 ABSOLUTE ANEMIA: Status: ACTIVE | Noted: 2017-01-01

## 2017-09-25 PROBLEM — D64.9 ANEMIA: Status: ACTIVE | Noted: 2017-01-01

## 2017-09-25 PROBLEM — K92.1 MELENA: Status: ACTIVE | Noted: 2017-01-01

## 2017-09-26 NOTE — PLAN OF CARE
Maintains hematologic stability Progressing    Hgb 10.1 this am.  Absence of fever/infection during anticipated neutropenic period Progressing    Pt afebrile. Pt sleeping this am, now awake. Alert and oriented to person only, [place], macular degeneration.

## 2017-09-26 NOTE — H&P
[facility] Hospitalist H&P       CC: anemia    PCP: Ravinder Rodriges DO    History of Present Illness:   Pt is a 79 yo with chronic anemia (baseline Hb 8s), B12 deficiency, hypothryoidism, sent to ED for anemia. Hb found to be in 7s at [facility]. Here 8.4.   Was g Neck: Supple, symmetrical   Lungs:   Clear to auscultation bilaterally. Normal effort   Chest wall:  No tenderness or deformity. Heart:  Regular rate and rhythm, S1, S2 normal,no LE edema   Abdomen:   Soft, non-tender.  Bowel sounds normal. . Non disten

## 2017-09-26 NOTE — CM/SW NOTE
09/26/17 1300   CM/SW Referral Data   Referral Source Social Work (self-referral)   Reason for Referral Discharge planning   Informant Allyson Miguel; Other  (chart review)   Patient Info   Patient's Mental Status Confused   Patient's Home Environment Sub-

## 2017-09-26 NOTE — PROGRESS NOTES
09/26/17 1623   Clinical Encounter Type   Visited With Patient and family together   Routine Visit Introduction   Continue Visiting No   Patient's Supportive Strategies/Resources  provided emotional support for patient.    Referral From Nurse   P

## 2017-09-26 NOTE — ED PROVIDER NOTES
Patient Seen in: [facility] Emergency Department    History   Patient presents with:  Abnormal Result (metabolic, cardiac)    Stated Complaint: arrived via EMS for low Hgb, 7.4 today, 7.9 on 9/23    HPI    This is a 80-year-old female sent here to t pale.  HEENT: Pupils are equal reactive to light. Extra ocular motions are intact. No scleral icterus or conjunctival pallor: Neck is supple without tenderness on palpation. Head is atraumatic normocephalic. Oral mucosa moist.  Tongue is midline.   No p Narrative: The following orders were created for panel order TYPE AND SCREEN.   Procedure                               Abnormality         Status                     ---------                               -----------         ------

## 2017-09-26 NOTE — PROGRESS NOTES
Brief Internal Medicine Note    Full Note to Follow      Pt is a 81 yo with chronic anemia (baseline Hb 8s), B12 deficiency, hypothryoidism, sent to ED for anemia. Hb found to be in 7s at [facility]. Here 8.4.   Was given 2 U PRBC overnight with appropriate respo

## 2017-09-26 NOTE — CM/SW NOTE
JUAN CARLOS received a message from Shiloh Murillo confirming he would like the pt to return to [facility] at discharge. Cherri confirmed the pt is there under private pay.

## 2017-09-26 NOTE — ED NOTES
PRBC 1st unit transfusion started, verified with Cristopher Tan RN. Pt resting in bed, no change in assessment.  Family at bedside

## 2017-09-26 NOTE — ED NOTES
Pt resting in bed, more warm blankets provided per request. Family at bedside. Pt and family aware of the POC.  Awaiting PRBC transfusion

## 2017-09-26 NOTE — PHYSICAL THERAPY NOTE
PHYSICAL THERAPY EVALUATION - INPATIENT     Room Number: 418/418-A  Evaluation Date: 9/26/2017  Type of Evaluation: Initial  Physician Order: PT Eval and Treat    Presenting Problem: Anemia  Reason for Therapy: Mobility Dysfunction and Discharge [facility] following TREVER to support patient's dependent needs. SUBJECTIVE  \"whatever you say! \"    Patient self-stated goal is none stated    OBJECTIVE  Precautions: Bed/chair alarm; Low vision;Hard of hearing  Fall Risk: High fall risk    WEIGHT BEARING Scale): 23.55   CMS Modifier (G-Code): CN    FUNCTIONAL ABILITY STATUS  Gait Assessment   Gait Assistance: Not tested                   Skilled Therapy Provided: Patient received supine in bed, lethargic upon arrival but agreeable to therapy once becoming intervention following D/C to improve functional mobility and return to PLOF. Recommending trial TREVER to determine if patient is able to consistently and meaningfully participate in therapy.  If patient is unable to do so and if deficits persist, pt will naomy

## 2017-09-26 NOTE — PROGRESS NOTES
NURSING ADMISSION NOTE      Patient admitted via Cart from ER with blood transfusion going on. Patient is alert to name  only, but calm and  Cooperative, most admitting information given by family member at the bedside. Oriented to room.  Safety preca

## 2017-09-26 NOTE — ED INITIAL ASSESSMENT (HPI)
Arrived via EMS for low Hgb, 7.4 today, was 7.9 on 9/23. [facility]. She is alert, oriented x 2 which is her norm. Pt denies pain, denies MARKEL.  No reports of rectal bleed, hematuria or bleeding elsewhere per EMS repor

## 2017-09-27 NOTE — PHYSICAL THERAPY NOTE
PHYSICAL THERAPY TREATMENT NOTE - INPATIENT    Room Number: 923/703-L     Session: 1   Number of Visits to Meet Established Goals: 5    Presenting Problem: Anemia     History related to current admission: Pt is 80year old female admitted on 9/25/2017 fro a chair with arms (e.g., wheelchair, bedside commode, etc.): Unable   -   Moving from lying on back to sitting on the side of the bed?: Unable   How much help from another person does the patient currently need. ..   -   Moving to and from a bed to a chair transfer, but she is unable to correctly identify need for assistance. Will continue to follow patient for once more session to assess further need for skilled intervention.      PTA, patient receiving therapy at [facility] and will continue to benefit from skille

## 2017-09-27 NOTE — OCCUPATIONAL THERAPY NOTE
OCCUPATIONAL THERAPY EVALUATION - INPATIENT     Room Number: 418/418-A  Evaluation Date: 9/27/2017  Type of Evaluation: Initial  Presenting Problem: Anemia    Physician Order: IP Consult to Occupational Therapy  Reason for Therapy: ADL/IADL Dysfunction and hearing  Fall Risk: High fall risk    WEIGHT BEARING RESTRICTION                   PAIN ASSESSMENT  Ratin  Location: no pain at this time       COGNITION  Orientation Level:  oriented to person  Memory:  impaired working memory and decreased short term personal grooming such as brushing teeth?: A Lot  -   Eating meals?: A Little    AM-PAC Score:  Score: 9  Approx Degree of Impairment: 79.59%  Standardized Score (AM-PAC Scale): 25.33  CMS Modifier (G-Code): CL    FUNCTIONAL TRANSFER ASSESSMENT  Supine to her prior level of function. Subacute rehab is recommended for 5-7 days. After this period of rehabilitation patient should achieve max assist level in mobility and self-care.   Pt may require additional time at [facility] and a more supportive living environmen

## 2017-09-27 NOTE — PROGRESS NOTES
Alert,oriented. patient complained of abdominal pain,Tylenol x1 given. patient able to  Sleep. Mipelex to stage 2 coccyx wound C/D/I. Resting quietly at this time.

## 2017-09-27 NOTE — PLAN OF CARE
DR BOB NOTIFIED THAT PATIENT CONTINUES TO COMPLAIN OF PERSISTANT NAUSEA, NO EMESIS, OBTAINED NEW ORDER FOR REGLAN PRN DOSE FOR NAUSEA AND GIVEN

## 2017-09-27 NOTE — PLAN OF CARE
VITALS STABLE, RESTING IN BED, PATIENT HAD A FEW SPOONSFUL OF CHICKEN NOODLE SOUP.  PATIENT SAT UP IN CHAIR FOR APPROXIMATELY ONE IN A HALF HOURS

## 2017-09-27 NOTE — PROGRESS NOTES
[facility] Hospitalist Progress Note     PCP: Gem Chan DO    CC:  Follow up    SUBJECTIVE:  Pt sitting up in chair, asleep.  Appears comfortable    OBJECTIVE:  Temp:  [97.9 °F (36.6 °C)-98.5 °F (36.9 °C)] 98.5 °F (36.9 °C)  Pulse:  [68-77] 68  Resp:  [16-2 transfusion     B12 deficiency, hypothyroidism- no acute issues, continue home meds.  F/u with PCP as TSH level slightly elevated but fT4 normal--> suspect contributing to hyponatremia  -f/u with PCP in 1 week       Questions/concerns were discussed with pa

## 2017-09-27 NOTE — PLAN OF CARE
DISCHARGE PLANNING    • Discharge to home or other facility with appropriate resources Not Progressing        Impaired Activities of Daily Living    • Achieve highest/safest level of independence in self care Not Progressing        Impaired Functional Mobi

## 2017-09-28 NOTE — PLAN OF CARE
Impaired Functional Mobility    • Achieve highest/safest level of mobility/gait Not Progressing        SKIN/TISSUE INTEGRITY - ADULT    • Incision(s), wounds(s) or drain site(s) healing without S/S of infection Not Progressing          DISCHARGE PLANNING

## 2017-09-28 NOTE — CM/SW NOTE
JUAN CARLOS spoke w/[facility] management who confirmed the pt only had two midnights because the initial admitting order was signed and held then cancelled. Informed pt's HCPOA the pt would continue to private pay. ENRIQUE appeared upset due to the cost, but was agreeable.  U

## 2017-09-28 NOTE — PLAN OF CARE
A&Ox1, pleasantly confused. Hgb 9.9 from 9/26, s/p 2 PRBC. No s/s bleeding noted this shift. Up with max assist. BLE edema noted. Mepliex to sacrum for skin breakdown. Plan to DC today. Granddaughter at the bedside, updated with plan.

## 2017-09-28 NOTE — CM/SW NOTE
JUAN CARLOS spoke w/Cherri regarding pt's discharge. Cherri stated there is question whether or not the pt meets criteria to dc to [facility] under medicare benefits or private pay.  Cherri stated the dc needed to be cancelled until the admission order is clarified

## 2017-09-28 NOTE — PLAN OF CARE
Patient up in chair while rounding with night nurse, patient is tolerating chicken noodle soup and jello.  Patient up with assistance of two, difficult to transfer, patient is apprehensive, and doesn't assist, brief in place, has been incontient of urine se

## 2017-09-28 NOTE — DISCHARGE SUMMARY
General Medicine Discharge Summary     Patient ID:  Dimitri Milligan  80year old  1/8/1918    Admit date: 9/25/2017    Discharge date and time:9/28/2017    Attending Physician: Rachael Wise MD Tab  Take 1 tablet (50 mcg total) by mouth daily. Potassium Chloride ER 20 MEQ Oral Tab CR  Take 1 tablet by mouth daily.     Ferrous Sulfate 325 (65 Fe) MG Oral Tab  TK 1 T PO TID    furosemide 40 MG Oral Tab  TK 1 T PO QD    acetaminophen 325 MG Oral T

## 2017-09-28 NOTE — PLAN OF CARE
NURSING DISCHARGE NOTE    Discharged TREVER: [facility] [address] via Ambulance. Accompanied by Support staff  Belongings Taken by patient/family. AVS and education provided to patient/family. Discharge plan of care along with follow up needs discussed.  Family

## 2017-09-28 NOTE — CM/SW NOTE
09/28/17 1200   Discharge disposition   Discharged to: Skilled Nurs   Name of [address] [facility]   Discharge transportation JACK RANGEL arranged for [employer] transportation to [employer] at [address] [place] [address] from [facility]

## 2017-10-05 NOTE — PROGRESS NOTES
HPI:    Patient ID: Adam Irvin is a 80year old female. Pt with low hgb. Has refused GI evaluation for this. Was symptomatic when down to 7.4. Then went to ED and transfused 2 units. Went up to 9.9. Denied constipation and diarrhea.   No black Alert to name and place only. + forgetful. Skin: She is not diaphoretic. Vitals reviewed. ASSESSMENT/PLAN:   Weakness  (primary encounter diagnosis)  Anemia, unspecified type  Hypotension, unspecified hypotension type  Unclear hx of CHF.   EC

## 2023-03-27 NOTE — ED NOTES
Received call from patient's grandson, Pita Neumann. States he's on the way. [phone]. Patient with history of HFpEF presenting with worsened hypoxia, found to have b/l consolidations and pleural effusions  - strict I/Os, monitor UOP  - daily weights  - hold torsemide 40 QD i/s/o sepsis, will consider IV diuresis  - c/w Farxiga QD
